# Patient Record
Sex: FEMALE | Race: WHITE
[De-identification: names, ages, dates, MRNs, and addresses within clinical notes are randomized per-mention and may not be internally consistent; named-entity substitution may affect disease eponyms.]

---

## 2018-02-26 ENCOUNTER — HOSPITAL ENCOUNTER (INPATIENT)
Dept: HOSPITAL 96 - M.ERS | Age: 28
LOS: 1 days | Discharge: HOME | DRG: 149 | End: 2018-02-27
Attending: INTERNAL MEDICINE | Admitting: INTERNAL MEDICINE
Payer: COMMERCIAL

## 2018-02-26 VITALS — DIASTOLIC BLOOD PRESSURE: 74 MMHG | SYSTOLIC BLOOD PRESSURE: 108 MMHG

## 2018-02-26 VITALS — WEIGHT: 164 LBS | HEIGHT: 65.98 IN | BODY MASS INDEX: 26.36 KG/M2

## 2018-02-26 VITALS — SYSTOLIC BLOOD PRESSURE: 124 MMHG | DIASTOLIC BLOOD PRESSURE: 68 MMHG

## 2018-02-26 VITALS — DIASTOLIC BLOOD PRESSURE: 63 MMHG | SYSTOLIC BLOOD PRESSURE: 106 MMHG

## 2018-02-26 DIAGNOSIS — F12.10: ICD-10-CM

## 2018-02-26 DIAGNOSIS — Z88.1: ICD-10-CM

## 2018-02-26 DIAGNOSIS — H81.10: Primary | ICD-10-CM

## 2018-02-26 DIAGNOSIS — Z86.73: ICD-10-CM

## 2018-02-26 DIAGNOSIS — Z90.710: ICD-10-CM

## 2018-02-26 DIAGNOSIS — F17.210: ICD-10-CM

## 2018-02-26 DIAGNOSIS — F41.9: ICD-10-CM

## 2018-02-26 DIAGNOSIS — Z91.19: ICD-10-CM

## 2018-02-26 DIAGNOSIS — Q21.1: ICD-10-CM

## 2018-02-26 DIAGNOSIS — Z88.2: ICD-10-CM

## 2018-02-26 LAB
ABSOLUTE BASOPHILS: 0 THOU/UL (ref 0–0.2)
ABSOLUTE EOSINOPHILS: 0.2 THOU/UL (ref 0–0.7)
ABSOLUTE MONOCYTES: 0.4 THOU/UL (ref 0–1.2)
ALBUMIN SERPL-MCNC: 3.7 G/DL (ref 3.4–5)
ALP SERPL-CCNC: 77 U/L (ref 46–116)
ALT SERPL-CCNC: 25 U/L (ref 30–65)
ANION GAP SERPL CALC-SCNC: 7 MMOL/L (ref 7–16)
APTT BLD: 29.4 SECONDS (ref 25–31.3)
AST SERPL-CCNC: 22 U/L (ref 15–37)
BASOPHILS NFR BLD AUTO: 0.6 %
BILIRUB SERPL-MCNC: 0.1 MG/DL
BUN SERPL-MCNC: 8 MG/DL (ref 7–18)
CALCIUM SERPL-MCNC: 8.3 MG/DL (ref 8.5–10.1)
CHLORIDE SERPL-SCNC: 106 MMOL/L (ref 98–107)
CO2 SERPL-SCNC: 28 MMOL/L (ref 21–32)
CREAT SERPL-MCNC: 0.7 MG/DL (ref 0.6–1.3)
EOSINOPHIL NFR BLD: 3.5 %
GLUCOSE SERPL-MCNC: 92 MG/DL (ref 70–99)
GRANULOCYTES NFR BLD MANUAL: 51.6 %
HCT VFR BLD CALC: 41.1 % (ref 37–47)
HGB BLD-MCNC: 14.1 GM/DL (ref 12–15)
INR PPP: 1.1
LIPASE: 92 U/L (ref 73–393)
LYMPHOCYTES # BLD: 1.5 THOU/UL (ref 0.8–5.3)
LYMPHOCYTES NFR BLD AUTO: 34.5 %
MCH RBC QN AUTO: 30.8 PG (ref 26–34)
MCHC RBC AUTO-ENTMCNC: 34.4 G/DL (ref 28–37)
MCV RBC: 89.7 FL (ref 80–100)
MONOCYTES NFR BLD: 9.8 %
MPV: 8.5 FL. (ref 7.2–11.1)
NEUTROPHILS # BLD: 2.3 THOU/UL (ref 1.6–8.1)
NUCLEATED RBCS: 0 /100WBC
PLATELET COUNT*: 189 THOU/UL (ref 150–400)
POTASSIUM SERPL-SCNC: 3.8 MMOL/L (ref 3.5–5.1)
PROT SERPL-MCNC: 7.2 G/DL (ref 6.4–8.2)
PROTHROMBIN TIME: 10.5 SECONDS (ref 9.2–11.5)
RBC # BLD AUTO: 4.58 MIL/UL (ref 4.2–5)
RDW-CV: 13.4 % (ref 10.5–14.5)
SODIUM SERPL-SCNC: 141 MMOL/L (ref 136–145)
TROPONIN-I LEVEL: <0.06 NG/ML (ref ?–0.06)
WBC # BLD AUTO: 4.5 THOU/UL (ref 4–11)

## 2018-02-27 VITALS — DIASTOLIC BLOOD PRESSURE: 50 MMHG | SYSTOLIC BLOOD PRESSURE: 98 MMHG

## 2018-02-27 VITALS — DIASTOLIC BLOOD PRESSURE: 51 MMHG | SYSTOLIC BLOOD PRESSURE: 95 MMHG

## 2018-02-27 VITALS — SYSTOLIC BLOOD PRESSURE: 103 MMHG | DIASTOLIC BLOOD PRESSURE: 55 MMHG

## 2018-02-27 VITALS — SYSTOLIC BLOOD PRESSURE: 112 MMHG | DIASTOLIC BLOOD PRESSURE: 59 MMHG

## 2018-02-27 VITALS — DIASTOLIC BLOOD PRESSURE: 55 MMHG | SYSTOLIC BLOOD PRESSURE: 103 MMHG

## 2018-02-27 LAB
BILIRUB UR-MCNC: NEGATIVE MG/DL
CHOLEST SERPL-MCNC: 101 MG/DL (ref ?–200)
COLOR UR: YELLOW
HDLC SERPL-MCNC: 36 MG/DL (ref 40–?)
KETONES UR STRIP-MCNC: NEGATIVE MG/DL
LDLC SERPL-MCNC: 58 MG/DL (ref ?–100)
NITRITE UR QL STRIP: NEGATIVE
PROT UR QL STRIP: NEGATIVE
RBC # UR STRIP: NEGATIVE /UL
SERUM ASSESSMENT: (no result)
SP GR UR STRIP: 1.02 (ref 1–1.03)
TC:HDL: 2.8 RATIO
THC: POSITIVE
TRIGL SERPL-MCNC: 36 MG/DL (ref ?–150)
URINE CLARITY: CLEAR
URINE GLUCOSE-RANDOM: NEGATIVE
URINE LEUKOCYTES: NEGATIVE
UROBILINOGEN UR STRIP-ACNC: 0.2 E.U./DL (ref 0.2–1)
VLDLC SERPL CALC-MCNC: 7 MG/DL (ref ?–40)

## 2018-02-27 NOTE — EKG
[place] [address]
Phone:  [phone]                     ELECTROCARDIOGRAM REPORT      
_______________________________________________________________________________
 
Name:       CHRIS RIVERA             Room:           [address]    ADM [place]  
[facility]#:  [id number]      Account #:      [id number]  
Admission:  18     Attend Phys:    Kayla Dias MD 
Discharge:               Date of Birth:  [birth year]  
         Report #: [id number]
    [id number]
_______________________________________________________________________________
THIS REPORT FOR:  //name//                      
 
                         [facility] ED
                                       
Test Date:    2018               Test Time:    18:28:23
Pat Name:     CHRIS RIVERA         Department:   
Patient ID:   [id number]            Room:         [facility]
Gender:       F                        Technician:   TAVARES BRADY
:          1990               Requested By: Raya George
Order Number: [id number] MD:   Salomón Sapp
                                 Measurements
Intervals                              Axis          
Rate:         68                       P:            35
MS:           108                      QRS:          87
QRSD:         90                       T:            63
QT:           372                                    
QTc:          396                                    
                           Interpretive Statements
Sinus rhythm
Short MS interval
RSR' in V1 or V2, probably normal variant
Compared to ECG 2017 19:10:13
Short MS interval now present
RSR' in V1 or V2 now present
 
Electronically Signed On 2018 15:20:38 CST by Salomón Sapp
[email or web address]
 
 
 
 
 
 
 
 
 
 
 
 
 
 
 
 
  <ELECTRONICALLY SIGNED>
                                           By: Salomón Sapp MD, FACC   
  18     1520
D: 18 1828   _____________________________________
T: 18 1828   Salomón Sapp MD, FAC     /EPI

## 2018-02-27 NOTE — NUR
ASSUMED CARE AT 2050, REPORT RECEIVED FROM ER. PATIENT TO UNIT. PATIENT
ALERT/ORIENTED X4, RESTING IN BED WITH  AT BEDSIDE. PLACED ON TELE, SR.
ON ROOM AIR, NO SOB NOTED, SATS 98%. SL INTACT TO LEFT AC, FLUSHES WELL, IVF
INFUSING PER MAR. UP AD KRISTIAN IN ROOM. DENIES DIZZINESS, STATES JUST FEELING
"OUT OF PLACE." DR. IRENE CALLED FLOOR REGARDING CONSULT, ORDERS RECEIVED FOR
NIH SCALE. NIH DONE, SEE CHARTED. HS SNACK GIVEN AFTER SWALLOW EVAL NEGATIVE.
REFUSING SCD'S. MEDS PER MAR. ORIENTED TO CALL LIGHT, STAFF, AND BED CONTROLS.
CALL LIGHT WITHIN REACH, ENCOURAGED TO CALL FOR NEEDS.

## 2018-02-27 NOTE — NUR
CM ASSESSMENT;
Pt is A&O. Resides at home with her , who is at bedside. Independent
with ADLs. No DME. No hx of HH or SNF. Goal is to return home once medically
stable, possible dc today pending testing results. No needs anticipated.

## 2018-03-10 NOTE — CON
[address]  [address]                    CONSULTATION                  
_______________________________________________________________________________
 
Name:       CHRIS RIVERA             Room:           [address] [place]  
..#:  [id number]      Account #:      [id number]  
Admission:  02/26/18     Attend Phys:    Kayla Dias MD 
Discharge:  02/27/18     Date of Birth:  [birth year]  
         Report #: [id number]
                                                                     [id number]  
_______________________________________________________________________________
THIS REPORT FOR:  //name//                      
 
CC: Chari Dias
 
DATE OF SERVICE:  02/27/2018
 
 
HISTORY OF PRESENT ILLNESS:  This is a 27-year-old female patient, who was
evaluated today for acute onset of dizziness.  This happened yesterday and it
happened spontaneously.  She is feeling better today.  Review of system indicate
that this patient has a history of migraine headache.  She indicated it used to
be pretty frequent, but is not that frequent now.  She was admitted here in 2016
with a documented occipital lobe stroke.  She has no residual from that.  It
looks like she had a pretty extensive workup that time and that was
unremarkable.  That workup was reviewed and it did include sed rate, which was
normal.  She had a hypercoagulable profile and that was unremarkable.  I do not
believe she had cardiolipin antibodies or homocysteine screen.  I do not believe
she had a collagen vascular workup.  She did have a regular echo, which
demonstrated possible small patent foramen ovale.  She was recommended to have a
CHIDI as an outpatient, but she never followed up.  She was also asked to stop
smoking and she did not do that either, but looks more motivated now.  She did
have a hysterectomy, but she never was put on hormones.
 
REVIEW OF SYSTEMS:  Positive for the fact that she has taken Topamax in the
past.  She had stroke in the past.  She takes some over-the-counter pain
medication.  This was a relevant 14-point review of system.  Some of the records
indicate she may have manic depressive disorder also.
 
PAST MEDICAL HISTORY:  Positive for documented stroke.
 
FAMILY HISTORY:  Negative for any early age stroke.
 
SOCIAL HISTORY:  She smokes and drinks alcohol on special occasion.
 
PHYSICAL EXAMINATION:  Indicate she is alert, responsive, oriented.  Her speech,
concentration, fund of knowledge and memory is at her baseline.  Cranial nerve
examination 2-12 is unremarkable.  I do not find any hemianopsia.  She has a
good strength, sensation, reflexes and tone in all 4 extremities.  There is no
meningeal sign.  There is no carotid bruit.  Her pulses are palpable.  She has
no edema, cyanosis or jaundice.  She is a well-built individual who does not
have any dysmorphic features of eyes, ears and face.  Her vision and hearing
looks adequate.  Cardiac examination showed no atrial fibrillation or any
definite abnormality.  Blood pressure is 103/55, respirations 18, pulse is 56,
and temperature is 98.8.
 
 
 
 
[place] [address]                    CONSULTATION                  
_______________________________________________________________________________
 
Name:       CHRIS RIVERA AKANKSHA             Room:           [address] [place]  
[facility]#:  [id number]      Account #:      [id number]  
Admission:  02/26/18     Attend Phys:    Kayla Dias MD 
Discharge:  02/27/18     Date of Birth:  [birth year]  
         Report #: [id number]
                                                                     [id number]  
_______________________________________________________________________________
LABORATORY DATA:  Indicate a normal white count at 4.5.  She has no respiratory
difficulty or rhonchi.  I reviewed multiple imaging studies of MRI of the brain
and CT angiogram of the head and neck.  They are basically unremarkable.
 
IMPRESSION:
1.  Documented cerebrovascular accident in the past.
2.  It is not certain what the present episode was.  It could have been
transient ischemic attack, but it could have been labyrinthitis also.  It could
have been benign positional vertigo or any other ENT pathology.
3.  Nicotine abuse.
 
RECOMMENDATION:  I had a long talk with the patient and told her:
1.  She must stop smoking.
2.  She should take full aspirin.
3.  She should try to increase her HDL.
4.  She should follow up with the cardiologist.
5.  She needs 30 days event monitor to look for atrial fibrillation and CHIDI. 
That can be done as an outpatient.
6.  The question of patent foramen ovale need to be addressed with recent
recommendation, but that needs to be addressed once CHIDI quantify the patient's
severity of patent foramen ovale.
 
All of it was discussed with the patient in great detail.  She understands it. 
Neurologically, I do not believe I have anything else to add.  I did find
cardiolipin antibodies and they were unremarkable and so was the sed rate.  She
can have homocysteine level done as an outpatient.  Otherwise, I do not have
anything specific to add and she needs to follow up with the primary and that
she can be on a full-dose aspirin or Plavix depending upon what our cardiologist
would like to do.
 
More than 50 minutes of time was spent taking care of this patient today and
majority of that time was spent counseling the patient and coordinating her
care.
 
Thank you very much for this referral.
 
 
 
 
 
 
 
 
 
<ELECTRONICALLY SIGNED>
                                        By:  Gerry Villalobos MD             
03/10/18     1556
D: 02/27/18 1610_______________________________________
T: 02/28/18 0454Pmarimar Villalobos MD                /nt

## 2018-03-11 ENCOUNTER — HOSPITAL ENCOUNTER (EMERGENCY)
Dept: HOSPITAL 96 - M.ERS | Age: 28
Discharge: HOME | End: 2018-03-11
Payer: COMMERCIAL

## 2018-03-11 VITALS — BODY MASS INDEX: 25.39 KG/M2 | WEIGHT: 158.01 LBS | HEIGHT: 66 IN

## 2018-03-11 VITALS — SYSTOLIC BLOOD PRESSURE: 114 MMHG | DIASTOLIC BLOOD PRESSURE: 73 MMHG

## 2018-03-11 DIAGNOSIS — R10.32: ICD-10-CM

## 2018-03-11 DIAGNOSIS — Z88.2: ICD-10-CM

## 2018-03-11 DIAGNOSIS — Z86.73: ICD-10-CM

## 2018-03-11 DIAGNOSIS — Z90.710: ICD-10-CM

## 2018-03-11 DIAGNOSIS — R10.31: Primary | ICD-10-CM

## 2018-03-11 DIAGNOSIS — Z88.1: ICD-10-CM

## 2018-03-11 DIAGNOSIS — F17.200: ICD-10-CM

## 2018-03-11 DIAGNOSIS — F32.9: ICD-10-CM

## 2018-03-11 LAB
ABSOLUTE BASOPHILS: 0.1 THOU/UL (ref 0–0.2)
ABSOLUTE EOSINOPHILS: 0.1 THOU/UL (ref 0–0.7)
ABSOLUTE MONOCYTES: 0.4 THOU/UL (ref 0–1.2)
ALBUMIN SERPL-MCNC: 4.3 G/DL (ref 3.4–5)
ALP SERPL-CCNC: 76 U/L (ref 46–116)
ALT SERPL-CCNC: 35 U/L (ref 30–65)
ANION GAP SERPL CALC-SCNC: 9 MMOL/L (ref 7–16)
AST SERPL-CCNC: 22 U/L (ref 15–37)
BASOPHILS NFR BLD AUTO: 0.8 %
BILIRUB SERPL-MCNC: 0.4 MG/DL
BILIRUB UR-MCNC: NEGATIVE MG/DL
BUN SERPL-MCNC: 9 MG/DL (ref 7–18)
CALCIUM SERPL-MCNC: 9.2 MG/DL (ref 8.5–10.1)
CHLORIDE SERPL-SCNC: 105 MMOL/L (ref 98–107)
CO2 SERPL-SCNC: 30 MMOL/L (ref 21–32)
COLOR UR: YELLOW
CREAT SERPL-MCNC: 0.8 MG/DL (ref 0.6–1.3)
EOSINOPHIL NFR BLD: 1.7 %
GLUCOSE SERPL-MCNC: 107 MG/DL (ref 70–99)
GRANULOCYTES NFR BLD MANUAL: 69.9 %
HCT VFR BLD CALC: 46.3 % (ref 37–47)
HGB BLD-MCNC: 15.9 GM/DL (ref 12–15)
KETONES UR STRIP-MCNC: NEGATIVE MG/DL
LIPASE: 84 U/L (ref 73–393)
LYMPHOCYTES # BLD: 1.8 THOU/UL (ref 0.8–5.3)
LYMPHOCYTES NFR BLD AUTO: 22.2 %
MCH RBC QN AUTO: 31 PG (ref 26–34)
MCHC RBC AUTO-ENTMCNC: 34.3 G/DL (ref 28–37)
MCV RBC: 90.3 FL (ref 80–100)
MONOCYTES NFR BLD: 5.4 %
MPV: 8.4 FL. (ref 7.2–11.1)
NEUTROPHILS # BLD: 5.8 THOU/UL (ref 1.6–8.1)
NUCLEATED RBCS: 0 /100WBC
PLATELET COUNT*: 245 THOU/UL (ref 150–400)
POTASSIUM SERPL-SCNC: 4 MMOL/L (ref 3.5–5.1)
PROT SERPL-MCNC: 8.2 G/DL (ref 6.4–8.2)
PROT UR QL STRIP: NEGATIVE
RBC # BLD AUTO: 5.12 MIL/UL (ref 4.2–5)
RBC # UR STRIP: NEGATIVE /UL
RDW-CV: 12.7 % (ref 10.5–14.5)
SODIUM SERPL-SCNC: 144 MMOL/L (ref 136–145)
SP GR UR STRIP: 1.02 (ref 1–1.03)
URINE CLARITY: CLEAR
URINE GLUCOSE-RANDOM: NEGATIVE
URINE LEUKOCYTES-REFLEX: NEGATIVE
URINE NITRITE-REFLEX: NEGATIVE
UROBILINOGEN UR STRIP-ACNC: 0.2 E.U./DL (ref 0.2–1)
WBC # BLD AUTO: 8.2 THOU/UL (ref 4–11)

## 2019-11-22 ENCOUNTER — HOSPITAL ENCOUNTER (EMERGENCY)
Dept: HOSPITAL 96 - M.ERS | Age: 29
Discharge: HOME | End: 2019-11-22
Payer: COMMERCIAL

## 2019-11-22 VITALS — BODY MASS INDEX: 29.73 KG/M2 | WEIGHT: 185.01 LBS | HEIGHT: 66 IN

## 2019-11-22 VITALS — SYSTOLIC BLOOD PRESSURE: 95 MMHG | DIASTOLIC BLOOD PRESSURE: 60 MMHG

## 2019-11-22 DIAGNOSIS — Z90.710: ICD-10-CM

## 2019-11-22 DIAGNOSIS — Z88.2: ICD-10-CM

## 2019-11-22 DIAGNOSIS — Z86.73: ICD-10-CM

## 2019-11-22 DIAGNOSIS — Z88.1: ICD-10-CM

## 2019-11-22 DIAGNOSIS — R07.9: Primary | ICD-10-CM

## 2019-11-22 LAB
ABSOLUTE BASOPHILS: 0 THOU/UL (ref 0–0.2)
ABSOLUTE EOSINOPHILS: 0.1 THOU/UL (ref 0–0.7)
ABSOLUTE MONOCYTES: 0.4 THOU/UL (ref 0–1.2)
ALBUMIN SERPL-MCNC: 3.9 G/DL (ref 3.4–5)
ALP SERPL-CCNC: 77 U/L (ref 46–116)
ALT SERPL-CCNC: 26 U/L (ref 30–65)
ANION GAP SERPL CALC-SCNC: 8 MMOL/L (ref 7–16)
AST SERPL-CCNC: 16 U/L (ref 15–37)
BASOPHILS NFR BLD AUTO: 0.7 %
BILIRUB SERPL-MCNC: 0.3 MG/DL
BUN SERPL-MCNC: 9 MG/DL (ref 7–18)
CALCIUM SERPL-MCNC: 8.7 MG/DL (ref 8.5–10.1)
CHLORIDE SERPL-SCNC: 105 MMOL/L (ref 98–107)
CO2 SERPL-SCNC: 27 MMOL/L (ref 21–32)
CREAT SERPL-MCNC: 0.9 MG/DL (ref 0.6–1.3)
EOSINOPHIL NFR BLD: 2.2 %
GLUCOSE SERPL-MCNC: 88 MG/DL (ref 70–99)
GRANULOCYTES NFR BLD MANUAL: 62 %
HCT VFR BLD CALC: 40.6 % (ref 37–47)
HGB BLD-MCNC: 13.9 GM/DL (ref 12–15)
LYMPHOCYTES # BLD: 1.5 THOU/UL (ref 0.8–5.3)
LYMPHOCYTES NFR BLD AUTO: 27.3 %
MCH RBC QN AUTO: 30.4 PG (ref 26–34)
MCHC RBC AUTO-ENTMCNC: 34.3 G/DL (ref 28–37)
MCV RBC: 88.5 FL (ref 80–100)
MONOCYTES NFR BLD: 7.8 %
MPV: 8 FL. (ref 7.2–11.1)
NEUTROPHILS # BLD: 3.4 THOU/UL (ref 1.6–8.1)
NT-PRO BRAIN NAT PEPTIDE: 93 PG/ML (ref ?–300)
NUCLEATED RBCS: 0 /100WBC
PLATELET COUNT*: 229 THOU/UL (ref 150–400)
POTASSIUM SERPL-SCNC: 3.5 MMOL/L (ref 3.5–5.1)
PROT SERPL-MCNC: 7.5 G/DL (ref 6.4–8.2)
RBC # BLD AUTO: 4.59 MIL/UL (ref 4.2–5)
RDW-CV: 12.9 % (ref 10.5–14.5)
SODIUM SERPL-SCNC: 140 MMOL/L (ref 136–145)
WBC # BLD AUTO: 5.5 THOU/UL (ref 4–11)

## 2019-11-23 NOTE — EKG
[place] [address]
Phone:  [phone]                     ELECTROCARDIOGRAM REPORT      
_______________________________________________________________________________
 
Name:       CHRIS RIVERA             Room:                      [facility]#:  [id number]      Account #:      [id number]  
Admission:  19     Attend Phys:                         
Discharge:  19     Date of Birth:  [birth year]  
         Report #: [id number]
    [id number]
_______________________________________________________________________________
THIS REPORT FOR:  //name//                      
 
                         [facility] ED
                                       
Test Date:    2019               Test Time:    20:31:42
Pat Name:     CHRIS RIVERA         Department:   
Patient ID:   [id number]            Room:          
Gender:       F                        Technician:   LAKHWINDER
:          1990               Requested By: Caro Elizondo
Order Number: [id number] MD:   Dennis Moise
                                 Measurements
Intervals                              Axis          
Rate:         58                       P:            51
OK:           159                      QRS:          79
QRSD:         94                       T:            64
QT:           432                                    
QTc:          425                                    
                           Interpretive Statements
Sinus rhythm
Compared to ECG 2018 18:28:23
Short OK interval no longer present
 
Electronically Signed On 2019 11:28:07 CST by Dennis Moise
[email or web address]
 
 
 
 
 
 
 
 
 
 
 
 
 
 
 
 
 
 
 
  <ELECTRONICALLY SIGNED>
                                           By: Dennis Moise MD, [facility]     
  19     1128
D: 19   _____________________________________
T: 19   Dennis Moise MD, FACC       /EPI

## 2019-11-25 NOTE — EKG
[place] [address]
Phone:  [phone]                     ELECTROCARDIOGRAM REPORT      
_______________________________________________________________________________
 
Name:       CHRIS RIVERA             Room:                      [facility]#:  [id number]      Account #:      [id number]  
Admission:  19     Attend Phys:                         
Discharge:  19     Date of Birth:  [birth year]  
         Report #: [id number]
    [id number]
_______________________________________________________________________________
THIS REPORT FOR:  //name//                      
 
                         [facility] ED
                                       
Test Date:    2019               Test Time:    17:27:21
Pat Name:     CHRIS RIVERA         Department:   
Patient ID:   [id number]            Room:          
Gender:       F                        Technician:   AL
:          1990               Requested By: Caro Elizondo
Order Number: [id number] MD:   Wesley Young
                                 Measurements
Intervals                              Axis          
Rate:         70                       P:            0
KS:           72                       QRS:          85
QRSD:         98                       T:            43
QT:           383                                    
QTc:          414                                    
                           Interpretive Statements
Sinus rhythm
Short KS interval
Compared to ECG 2018 18:28:23
No significant changes
 
Electronically Signed On 2019 11:23:26 CST by Wesley Young
[email or web address]
 
 
 
 
 
 
 
 
 
 
 
 
 
 
 
 
 
 
  <ELECTRONICALLY SIGNED>
                                           By: Wesley Young MD, [facility]      
  19     1123
D: 19 1727   _____________________________________
T: 19 1727   Wesley Young MD, FACC        /EPI

## 2020-06-09 ENCOUNTER — HOSPITAL ENCOUNTER (EMERGENCY)
Dept: HOSPITAL 96 - M.ERS | Age: 30
Discharge: HOME | End: 2020-06-09
Payer: COMMERCIAL

## 2020-06-09 VITALS — DIASTOLIC BLOOD PRESSURE: 69 MMHG | SYSTOLIC BLOOD PRESSURE: 123 MMHG

## 2020-06-09 VITALS — HEIGHT: 66 IN | BODY MASS INDEX: 24.11 KG/M2 | WEIGHT: 150 LBS

## 2020-06-09 DIAGNOSIS — F32.9: ICD-10-CM

## 2020-06-09 DIAGNOSIS — M54.16: Primary | ICD-10-CM

## 2020-06-09 DIAGNOSIS — Z88.1: ICD-10-CM

## 2020-06-09 DIAGNOSIS — F12.10: ICD-10-CM

## 2020-06-09 DIAGNOSIS — M54.5: ICD-10-CM

## 2020-06-09 DIAGNOSIS — Z88.2: ICD-10-CM

## 2020-06-09 DIAGNOSIS — Z79.82: ICD-10-CM

## 2020-06-09 DIAGNOSIS — M25.552: ICD-10-CM

## 2020-06-09 DIAGNOSIS — Z90.710: ICD-10-CM
